# Patient Record
Sex: MALE | Race: WHITE | NOT HISPANIC OR LATINO | URBAN - METROPOLITAN AREA
[De-identification: names, ages, dates, MRNs, and addresses within clinical notes are randomized per-mention and may not be internally consistent; named-entity substitution may affect disease eponyms.]

---

## 2021-06-26 ENCOUNTER — WALK IN (OUTPATIENT)
Dept: URGENT CARE | Age: 51
End: 2021-06-26

## 2021-06-26 VITALS
TEMPERATURE: 98.1 F | HEART RATE: 88 BPM | SYSTOLIC BLOOD PRESSURE: 114 MMHG | DIASTOLIC BLOOD PRESSURE: 78 MMHG | RESPIRATION RATE: 18 BRPM

## 2021-06-26 DIAGNOSIS — H66.002 ACUTE SUPPURATIVE OTITIS MEDIA OF LEFT EAR WITHOUT SPONTANEOUS RUPTURE OF TYMPANIC MEMBRANE, RECURRENCE NOT SPECIFIED: ICD-10-CM

## 2021-06-26 DIAGNOSIS — H60.312 DIFFUSE OTITIS EXTERNA OF LEFT EAR, UNSPECIFIED CHRONICITY: Primary | ICD-10-CM

## 2021-06-26 PROCEDURE — 99203 OFFICE O/P NEW LOW 30 MIN: CPT | Performed by: NURSE PRACTITIONER

## 2021-06-26 RX ORDER — OFLOXACIN 3 MG/ML
5 SOLUTION AURICULAR (OTIC) 2 TIMES DAILY
Qty: 2.5 ML | Refills: 0 | Status: SHIPPED | OUTPATIENT
Start: 2021-06-26 | End: 2021-07-01

## 2021-06-26 RX ORDER — LORATADINE 10 MG/1
TABLET ORAL DAILY
COMMUNITY

## 2021-06-26 RX ORDER — AMOXICILLIN 875 MG/1
875 TABLET, COATED ORAL 2 TIMES DAILY
Qty: 20 TABLET | Refills: 0 | Status: SHIPPED | OUTPATIENT
Start: 2021-06-26 | End: 2021-07-06

## 2021-06-26 ASSESSMENT — ENCOUNTER SYMPTOMS
FEVER: 0
DIAPHORESIS: 0
RESPIRATORY NEGATIVE: 1
SORE THROAT: 0
CHILLS: 0
FATIGUE: 0
EYES NEGATIVE: 1
GASTROINTESTINAL NEGATIVE: 1
FACIAL SWELLING: 0

## 2021-10-18 ENCOUNTER — WALK IN (OUTPATIENT)
Dept: URGENT CARE | Age: 51
End: 2021-10-18

## 2021-10-18 DIAGNOSIS — J30.9 ALLERGIC RHINITIS, UNSPECIFIED SEASONALITY, UNSPECIFIED TRIGGER: Primary | ICD-10-CM

## 2021-10-18 DIAGNOSIS — H69.93 DYSFUNCTION OF BOTH EUSTACHIAN TUBES: ICD-10-CM

## 2021-10-18 DIAGNOSIS — H65.03 BILATERAL ACUTE SEROUS OTITIS MEDIA, RECURRENCE NOT SPECIFIED: ICD-10-CM

## 2021-10-18 PROCEDURE — 99214 OFFICE O/P EST MOD 30 MIN: CPT | Performed by: NURSE PRACTITIONER

## 2021-11-06 ENCOUNTER — TELEPHONE (OUTPATIENT)
Dept: SCHEDULING | Age: 51
End: 2021-11-06

## 2021-11-06 ENCOUNTER — WALK IN (OUTPATIENT)
Dept: URGENT CARE | Age: 51
End: 2021-11-06

## 2021-11-06 VITALS
DIASTOLIC BLOOD PRESSURE: 74 MMHG | SYSTOLIC BLOOD PRESSURE: 118 MMHG | TEMPERATURE: 97.3 F | HEIGHT: 71 IN | HEART RATE: 72 BPM | OXYGEN SATURATION: 98 % | RESPIRATION RATE: 14 BRPM | WEIGHT: 205 LBS | BODY MASS INDEX: 28.7 KG/M2

## 2021-11-06 DIAGNOSIS — H66.005 RECURRENT ACUTE SUPPURATIVE OTITIS MEDIA WITHOUT SPONTANEOUS RUPTURE OF LEFT TYMPANIC MEMBRANE: Primary | ICD-10-CM

## 2021-11-06 DIAGNOSIS — H60.502 ACUTE OTITIS EXTERNA OF LEFT EAR, UNSPECIFIED TYPE: ICD-10-CM

## 2021-11-06 PROCEDURE — 99214 OFFICE O/P EST MOD 30 MIN: CPT | Performed by: NURSE PRACTITIONER

## 2021-11-06 RX ORDER — AMOXICILLIN 875 MG/1
875 TABLET, COATED ORAL 2 TIMES DAILY
Qty: 20 TABLET | Refills: 0 | Status: SHIPPED | OUTPATIENT
Start: 2021-11-06 | End: 2021-11-16

## 2021-11-06 ASSESSMENT — ENCOUNTER SYMPTOMS
EYE REDNESS: 0
COUGH: 0
EYE DISCHARGE: 0
CHILLS: 0
FEVER: 0
DIARRHEA: 0
SORE THROAT: 0
WHEEZING: 0
HEADACHES: 0
RHINORRHEA: 0
VOMITING: 0

## 2022-09-12 ENCOUNTER — HOSPITAL ENCOUNTER (OUTPATIENT)
Age: 52
Discharge: HOME | End: 2022-09-12
Attending: EMERGENCY MEDICINE
Payer: COMMERCIAL

## 2022-09-12 VITALS
DIASTOLIC BLOOD PRESSURE: 71 MMHG | OXYGEN SATURATION: 97 % | SYSTOLIC BLOOD PRESSURE: 109 MMHG | TEMPERATURE: 98 F | HEART RATE: 80 BPM | RESPIRATION RATE: 16 BRPM

## 2022-09-12 DIAGNOSIS — L03.115 CELLULITIS OF RIGHT LOWER EXTREMITY WITHOUT FOOT: Primary | ICD-10-CM

## 2022-09-12 PROCEDURE — 99203 OFFICE O/P NEW LOW 30 MIN: CPT

## 2022-09-12 RX ORDER — MONTELUKAST SODIUM 10 MG/1
10 TABLET ORAL AS DIRECTED
COMMUNITY

## 2022-09-12 RX ORDER — DOXYCYCLINE HYCLATE 100 MG/1
100 CAPSULE ORAL 2 TIMES DAILY
Qty: 28 CAPSULE | Refills: 0 | Status: SHIPPED | OUTPATIENT
Start: 2022-09-12 | End: 2022-09-26

## 2022-09-12 RX ORDER — CEPHALEXIN 500 MG/1
500 CAPSULE ORAL 2 TIMES DAILY
Qty: 28 CAPSULE | Refills: 0 | Status: SHIPPED | OUTPATIENT
Start: 2022-09-12 | End: 2022-09-26

## 2022-09-12 NOTE — ED INITIAL ASSESSMENT (HPI)
r lower leg redness and swelling, possible exposure to cedum plant on Saturday, no fever, no chills, no drainage

## 2024-07-30 ENCOUNTER — TELEPHONE (OUTPATIENT)
Dept: URGENT CARE | Age: 54
End: 2024-07-30

## 2024-07-30 ENCOUNTER — WALK IN (OUTPATIENT)
Dept: URGENT CARE | Age: 54
End: 2024-07-30

## 2024-07-30 VITALS
RESPIRATION RATE: 14 BRPM | WEIGHT: 220 LBS | OXYGEN SATURATION: 97 % | DIASTOLIC BLOOD PRESSURE: 92 MMHG | SYSTOLIC BLOOD PRESSURE: 133 MMHG | HEIGHT: 71 IN | HEART RATE: 97 BPM | TEMPERATURE: 100.8 F | BODY MASS INDEX: 30.8 KG/M2

## 2024-07-30 DIAGNOSIS — J02.0 STREP THROAT: ICD-10-CM

## 2024-07-30 DIAGNOSIS — R50.9 FEVER AND CHILLS: ICD-10-CM

## 2024-07-30 DIAGNOSIS — U07.1 COVID-19 VIRUS DETECTED: ICD-10-CM

## 2024-07-30 DIAGNOSIS — Z20.818 EXPOSURE TO STREP THROAT: Primary | ICD-10-CM

## 2024-07-30 LAB
FLUAV AG UPPER RESP QL IA.RAPID: NEGATIVE
FLUBV AG UPPER RESP QL IA.RAPID: NEGATIVE
INTERNAL PROCEDURAL CONTROLS ACCEPTABLE: YES
S PYO AG THROAT QL IA.RAPID: POSITIVE
SARS-COV+SARS-COV-2 AG RESP QL IA.RAPID: DETECTED
TEST LOT EXPIRATION DATE: ABNORMAL
TEST LOT EXPIRATION DATE: ABNORMAL
TEST LOT NUMBER: ABNORMAL
TEST LOT NUMBER: ABNORMAL

## 2024-07-30 RX ORDER — AMOXICILLIN 500 MG/1
500 CAPSULE ORAL 2 TIMES DAILY
Qty: 20 CAPSULE | Refills: 0 | Status: SHIPPED | OUTPATIENT
Start: 2024-07-30 | End: 2024-08-09

## 2024-07-30 ASSESSMENT — ENCOUNTER SYMPTOMS
FATIGUE: 1
RHINORRHEA: 0
EYES NEGATIVE: 1
SINUS PAIN: 0
TROUBLE SWALLOWING: 0
SINUS PRESSURE: 0
SORE THROAT: 1
VOICE CHANGE: 0
HEADACHES: 1
FACIAL SWELLING: 0
LIGHT-HEADEDNESS: 0
FEVER: 1
CHILLS: 1
GASTROINTESTINAL NEGATIVE: 1
PSYCHIATRIC NEGATIVE: 1
RESPIRATORY NEGATIVE: 1
DIZZINESS: 0

## 2024-08-02 ENCOUNTER — TELEPHONE (OUTPATIENT)
Dept: FAMILY MEDICINE | Age: 54
End: 2024-08-02

## 2024-08-10 ENCOUNTER — TELEPHONE (OUTPATIENT)
Dept: URGENT CARE | Age: 54
End: 2024-08-10

## 2024-08-11 ENCOUNTER — TELEPHONE (OUTPATIENT)
Dept: OTHER | Age: 54
End: 2024-08-11

## 2024-08-11 ENCOUNTER — APPOINTMENT (OUTPATIENT)
Dept: URGENT CARE | Age: 54
End: 2024-08-11

## 2025-03-03 ENCOUNTER — NURSE ONLY (OUTPATIENT)
Dept: LAB | Age: 55
End: 2025-03-03
Attending: NURSE PRACTITIONER
Payer: COMMERCIAL

## 2025-03-03 ENCOUNTER — OFFICE VISIT (OUTPATIENT)
Dept: FAMILY MEDICINE CLINIC | Facility: CLINIC | Age: 55
End: 2025-03-03
Payer: COMMERCIAL

## 2025-03-03 VITALS
TEMPERATURE: 103 F | HEART RATE: 114 BPM | BODY MASS INDEX: 33.59 KG/M2 | HEIGHT: 69.29 IN | WEIGHT: 229.38 LBS | RESPIRATION RATE: 22 BRPM | DIASTOLIC BLOOD PRESSURE: 72 MMHG | SYSTOLIC BLOOD PRESSURE: 114 MMHG

## 2025-03-03 DIAGNOSIS — J10.1 INFLUENZA B: Primary | ICD-10-CM

## 2025-03-03 DIAGNOSIS — R50.9 FEVER IN ADULT: ICD-10-CM

## 2025-03-03 LAB
OPERATOR ID: NORMAL
POCT INFLUENZA A: NEGATIVE
POCT INFLUENZA B: POSITIVE
RAPID SARS-COV-2 BY PCR: NOT DETECTED

## 2025-03-03 PROCEDURE — 87502 INFLUENZA DNA AMP PROBE: CPT

## 2025-03-03 RX ORDER — ACETAMINOPHEN 500 MG
1000 TABLET ORAL ONCE
Status: COMPLETED | OUTPATIENT
Start: 2025-03-03 | End: 2025-03-03

## 2025-03-03 RX ORDER — MULTIVITAMIN
1 TABLET ORAL DAILY
COMMUNITY

## 2025-03-03 RX ORDER — BENZONATATE 200 MG/1
200 CAPSULE ORAL 3 TIMES DAILY PRN
Qty: 20 CAPSULE | Refills: 0 | Status: SHIPPED | OUTPATIENT
Start: 2025-03-03

## 2025-03-03 RX ORDER — EPINEPHRINE 0.3 MG/.3ML
1 INJECTION SUBCUTANEOUS AS DIRECTED
COMMUNITY
Start: 2024-04-05

## 2025-03-03 RX ORDER — PSEUDOEPHEDRINE HCL 120 MG/1
TABLET, FILM COATED, EXTENDED RELEASE ORAL
COMMUNITY
Start: 2023-07-11

## 2025-03-03 RX ORDER — OSELTAMIVIR PHOSPHATE 75 MG/1
75 CAPSULE ORAL 2 TIMES DAILY
Qty: 10 CAPSULE | Refills: 0 | Status: SHIPPED | OUTPATIENT
Start: 2025-03-03 | End: 2025-03-08

## 2025-03-03 RX ADMIN — ACETAMINOPHEN 1000 MG: 500 MG TABLET ORAL at 08:04:00

## 2025-03-03 NOTE — PROGRESS NOTES
CHIEF COMPLAINT:     Chief Complaint   Patient presents with    Cough     Alternating between fever and chills. Throat is raw from coughing. - Entered by patient       HPI:   Jerrell Ordoñez is a 54 year old male who presents for upper respiratory symptoms for almost 3 days. Patient reports cough.  Associated symptoms include feverish, chills, sweating, throat is raw.  Denies GI symptoms.    Symptoms have been increasing since onset.  Treating symptoms with cough drops, Mucinex.     No FLU or COVID exposure    Current Outpatient Medications   Medication Sig Dispense Refill    EPINEPHrine 0.3 MG/0.3ML Injection Solution Auto-injector Take 0.3 mL (1 each total) by mouth As Directed.      pseudoephedrine  MG Oral Tablet 12 Hr Take 1 tablet every 12 hours by oral route.      Multiple Vitamin (ONE-DAILY MULTI VITAMINS) Oral Tab Take 1 tablet by mouth daily.      montelukast 10 MG Oral Tab Take 10 mg by mouth As Directed. (Patient not taking: Reported on 3/3/2025)        History reviewed. No pertinent past medical history.   History reviewed. No pertinent surgical history.      Social History     Socioeconomic History    Marital status:    Tobacco Use    Smoking status: Never    Smokeless tobacco: Never   Vaping Use    Vaping status: Never Used   Substance and Sexual Activity    Alcohol use: Not Currently    Drug use: Not Currently         REVIEW OF SYSTEMS:   GENERAL: feels sick, Decreased appetite.  Drinking plenty of fluids  SKIN: no rashes or abnormal skin lesions  HEENT: See HPI  LUNGS: denies shortness of breath or wheezing, See HPI  CARDIOVASCULAR: denies chest pain or palpitations   GI: denies N/V/C or abdominal pain  NEURO: Denies headaches    EXAM:   /72 (BP Location: Left arm, Patient Position: Sitting, Cuff Size: large)   Pulse 114   Temp (!) 102.7 °F (39.3 °C) (Oral)   Resp 22   Ht 5' 9.29\" (1.76 m)   Wt 229 lb 6.4 oz (104.1 kg)   BMI 33.59 kg/m²   GENERAL: well developed, well  nourished, in no apparent distress  SKIN: no rashes,no suspicious lesions  HEAD: atraumatic, normocephalic.  No tenderness on palpation of sinuses  EYES: conjunctiva clear  EARS: TM's pearly, no bulging, no retraction, no fluid, bony landmarks visualized  NOSE: Nostrils patent, clear nasal discharge, nasal mucosa pink and not inflamed  THROAT: Oral mucosa pink, moist. Posterior pharynx is not erythematous. + post nasal drip.  Tonsils 1/4.    NECK: Supple, non-tender  LUNGS: clear to auscultation bilaterally; good air movement.  Breathing is non labored.  CARDIO: RRR without murmur  EXTREMITIES: no cyanosis, clubbing or edema  LYMPH:  No cervical lymphadenopathy.      Results for orders placed or performed in visit on 03/03/25   Rapid Covid-19    Collection Time: 03/03/25  8:16 AM    Specimen: Nares   Result Value Ref Range    Rapid SARS-CoV-2 by PCR Not Detected Not Detected    POCT Lot Number [id number]     POCT Expiration Date 7/30/2026     POCT Procedure Control Control Valid Control Valid     ,123          ASSESSMENT AND PLAN:   Jerrell Ordoñez is a 54 year old male who presents with     ASSESSMENT:   Encounter Diagnoses   Name Primary?    Fever in adult     Influenza B Yes       PLAN:   Discussed FLU recommendations.  He would like to try Tamiflu.    2 ES tylenol given for fever  Meds as below.  Risks, benefits, and side effects of medication explained and discussed.  Discussed viral etiology. Reviewed symptom relief measures with patient.   To f/u with PCP if sx do not resolve as anticipated.      Meds & Refills for this Visit:  Requested Prescriptions     Signed Prescriptions Disp Refills    oseltamivir 75 MG Oral Cap 10 capsule 0     Sig: Take 1 capsule (75 mg total) by mouth 2 (two) times daily for 5 days.    benzonatate 200 MG Oral Cap 20 capsule 0     Sig: Take 1 capsule (200 mg total) by mouth 3 (three) times daily as needed for cough. Take with a full glass of water and DO NOT CHEW.            Patient Instructions   -Tamiflu as prescribed.   -Benzonatate for dry cough  -Push fluids and plenty of rest    -OTC cough medicine such as Mucinex DM or Robitussin DM (guaifenesin and dextromethorphan) as packet insert for dry and congested cough.    -OTC Tylenol/Ibuprofen as packet insert If no allergies  -Soothing cough drops as packet insert   -Good handwashing, to prevent spread of virus    -Face mask helps prevent viral infections    Follow up in 3-5 days for worsening symptoms with clinic or PCP       The patient indicates understanding of these issues and agrees to the plan.

## 2025-03-03 NOTE — PATIENT INSTRUCTIONS
-Tamiflu as prescribed.   -Benzonatate for dry cough  -Push fluids and plenty of rest    -OTC cough medicine such as Mucinex DM or Robitussin DM (guaifenesin and dextromethorphan) as packet insert for dry and congested cough.    -OTC Tylenol/Ibuprofen as packet insert If no allergies  -Soothing cough drops as packet insert   -Good handwashing, to prevent spread of virus    -Face mask helps prevent viral infections    Follow up in 3-5 days for worsening symptoms with clinic or PCP